# Patient Record
Sex: MALE | Race: WHITE | Employment: FULL TIME | URBAN - METROPOLITAN AREA
[De-identification: names, ages, dates, MRNs, and addresses within clinical notes are randomized per-mention and may not be internally consistent; named-entity substitution may affect disease eponyms.]

---

## 2021-11-26 ENCOUNTER — HOSPITAL ENCOUNTER (OUTPATIENT)
Age: 61
Setting detail: OUTPATIENT SURGERY
End: 2021-11-26
Attending: SPECIALIST | Admitting: SPECIALIST

## 2021-11-26 NOTE — PROGRESS NOTES
Discussed situation w/pt r/t no PAT appts w/H&P prior to DOS. Pt has appt w/PCP on 12/1. Instructed him to change appt type to pre-op H&P & to call back only if there was an issue w/that request. Pt verbalized understanding.

## 2021-12-02 NOTE — PERIOP NOTES
During reminder call for PAT appt 12/3 , patient mentioned he was having a stress test on 12/8 the day prior to surgery. Asked if that was an issue - patient was instructed during phone call to call Dr. Katelyn Kowalski to discuss.

## 2021-12-03 ENCOUNTER — HOSPITAL ENCOUNTER (OUTPATIENT)
Dept: PREADMISSION TESTING | Age: 61
Discharge: HOME | End: 2021-12-03
Payer: COMMERCIAL

## 2021-12-03 VITALS
DIASTOLIC BLOOD PRESSURE: 83 MMHG | BODY MASS INDEX: 39.96 KG/M2 | HEART RATE: 77 BPM | RESPIRATION RATE: 16 BRPM | SYSTOLIC BLOOD PRESSURE: 133 MMHG | HEIGHT: 72 IN | WEIGHT: 295 LBS | OXYGEN SATURATION: 96 % | TEMPERATURE: 98.4 F

## 2021-12-03 LAB
ABO + RH BLD: NORMAL
ANION GAP SERPL CALC-SCNC: 9 MMOL/L (ref 5–15)
ATRIAL RATE: 60 BPM
BASOPHILS # BLD: 0.1 K/UL (ref 0–0.1)
BASOPHILS NFR BLD: 1 % (ref 0–1)
BLOOD GROUP ANTIBODIES SERPL: NORMAL
BUN SERPL-MCNC: 23 MG/DL (ref 6–20)
BUN/CREAT SERPL: 23 (ref 12–20)
CALCIUM SERPL-MCNC: 9 MG/DL (ref 8.5–10.1)
CALCULATED P AXIS, ECG09: 51 DEGREES
CALCULATED R AXIS, ECG10: 44 DEGREES
CALCULATED T AXIS, ECG11: 27 DEGREES
CHLORIDE SERPL-SCNC: 105 MMOL/L (ref 97–108)
CO2 SERPL-SCNC: 25 MMOL/L (ref 21–32)
CREAT SERPL-MCNC: 1 MG/DL (ref 0.7–1.3)
DIAGNOSIS, 93000: NORMAL
DIFFERENTIAL METHOD BLD: NORMAL
EOSINOPHIL # BLD: 0.4 K/UL (ref 0–0.4)
EOSINOPHIL NFR BLD: 5 % (ref 0–7)
ERYTHROCYTE [DISTWIDTH] IN BLOOD BY AUTOMATED COUNT: 12 % (ref 11.5–14.5)
GLUCOSE SERPL-MCNC: 276 MG/DL (ref 65–100)
HCT VFR BLD AUTO: 41.5 % (ref 36.6–50.3)
HGB BLD-MCNC: 14.2 G/DL (ref 12.1–17)
IMM GRANULOCYTES # BLD AUTO: 0 K/UL (ref 0–0.04)
IMM GRANULOCYTES NFR BLD AUTO: 0 % (ref 0–0.5)
LYMPHOCYTES # BLD: 2.2 K/UL (ref 0.8–3.5)
LYMPHOCYTES NFR BLD: 32 % (ref 12–49)
MCH RBC QN AUTO: 31.3 PG (ref 26–34)
MCHC RBC AUTO-ENTMCNC: 34.2 G/DL (ref 30–36.5)
MCV RBC AUTO: 91.4 FL (ref 80–99)
MONOCYTES # BLD: 0.5 K/UL (ref 0–1)
MONOCYTES NFR BLD: 8 % (ref 5–13)
NEUTS SEG # BLD: 3.7 K/UL (ref 1.8–8)
NEUTS SEG NFR BLD: 54 % (ref 32–75)
NRBC # BLD: 0 K/UL (ref 0–0.01)
NRBC BLD-RTO: 0 PER 100 WBC
P-R INTERVAL, ECG05: 168 MS
PLATELET # BLD AUTO: 255 K/UL (ref 150–400)
PMV BLD AUTO: 9 FL (ref 8.9–12.9)
POTASSIUM SERPL-SCNC: 4.4 MMOL/L (ref 3.5–5.1)
Q-T INTERVAL, ECG07: 414 MS
QRS DURATION, ECG06: 146 MS
QTC CALCULATION (BEZET), ECG08: 414 MS
RBC # BLD AUTO: 4.54 M/UL (ref 4.1–5.7)
SODIUM SERPL-SCNC: 139 MMOL/L (ref 136–145)
SPECIMEN EXP DATE BLD: NORMAL
VENTRICULAR RATE, ECG03: 60 BPM
WBC # BLD AUTO: 6.8 K/UL (ref 4.1–11.1)

## 2021-12-03 PROCEDURE — 36415 COLL VENOUS BLD VENIPUNCTURE: CPT

## 2021-12-03 PROCEDURE — U0005 INFEC AGEN DETEC AMPLI PROBE: HCPCS

## 2021-12-03 PROCEDURE — 93005 ELECTROCARDIOGRAM TRACING: CPT

## 2021-12-03 PROCEDURE — 86901 BLOOD TYPING SEROLOGIC RH(D): CPT

## 2021-12-03 PROCEDURE — 85025 COMPLETE CBC W/AUTO DIFF WBC: CPT

## 2021-12-03 PROCEDURE — 80048 BASIC METABOLIC PNL TOTAL CA: CPT

## 2021-12-03 RX ORDER — GLIPIZIDE 10 MG/1
10 TABLET ORAL 2 TIMES DAILY
COMMUNITY

## 2021-12-03 RX ORDER — IBUPROFEN 200 MG
400 TABLET ORAL 2 TIMES DAILY
COMMUNITY

## 2021-12-03 RX ORDER — METFORMIN HYDROCHLORIDE 1000 MG/1
1000 TABLET ORAL 2 TIMES DAILY WITH MEALS
COMMUNITY

## 2021-12-03 RX ORDER — FENOFIBRATE 160 MG/1
160 TABLET ORAL DAILY
COMMUNITY

## 2021-12-03 RX ORDER — ATORVASTATIN CALCIUM 20 MG/1
TABLET, FILM COATED ORAL DAILY
COMMUNITY

## 2021-12-03 NOTE — PERIOP NOTES
[address] [address] [facility]   MAIN OR                                  [phone]   MAIN PRE OP                          [phone]                                                                                AMBULATORY PRE OP          [phone]  PRE-ADMISSION TESTING    [phone]   Surgery Date:  12/9/21 THURSDAY       Is surgery arrival time given by surgeon? NO  If NO, [facility] staff will call you between 3 and 7pm the day before your surgery with your arrival time. (If your surgery is on a Monday, we will call you the Friday before.)    Call [phone] after 7pm Monday-Friday if you did not receive this call. INSTRUCTIONS BEFORE YOUR SURGERY   When You  Arrive Arrive at the 2[address] [facility] on the day of your surgery  Have your insurance card, photo ID, and any copayment (if needed)   Food   and   Drink NO food or drink after midnight the night before surgery    This means NO water, gum, mints, coffee, juice, etc.  No alcohol (beer, wine, liquor) 24 hours before and after surgery   Medications to   TAKE   Morning of Surgery MEDICATIONS TO TAKE THE MORNING OF SURGERY WITH A SIP OF WATER:    NONE     Medications  To  STOP      7 days before surgery  Non-Steroidal anti-inflammatory Drugs (NSAID's): for example, Ibuprofen (Advil, Motrin), Naproxen (Aleve)   Aspirin, if taking for pain    Herbal supplements, vitamins, and fish oil   Other:  (Pain medications not listed above, including Tylenol may be taken)   Blood  Thinners  If you take  Aspirin, Plavix, Coumadin, or any blood-thinning or anti-blood clot medicine, talk to the doctor who prescribed the medications for pre-operative instructions.    Bathing Clothing  Jewelry  Valuables      If you shower the morning of surgery, please do not apply anything to your skin (lotions, powders, deodorant, or makeup, especially mascara)   Follow Chlorhex[employer] body wash instructions provided to you during PAT appointment. Begin 3 days prior to surgery.  Do not shave or trim anywhere 24 hours before surgery   Wear your hair loose or down; no pony-tails, buns, or metal hair clips   Wear loose, comfortable, clean clothes   Wear glasses instead of contacts  [employer] money, valuables, and jewelry, including body piercings, at home   If you were given an [employer], bring it on day of surgery. Going Home - or Spending the Night  SAME-DAY SURGERY: You must have a responsible adult drive you home and stay with you 24 hours after surgery   ADMITS: If your doctor is keeping you in the hospital after surgery, leave personal belongings/luggage in your car until you have a hospital room number. Hospital discharge time is 12 noon  Drivers must be here before 12 noon unless you are told differently   Special Instructions   Patients are advised to self-quarantine at home  prior to your surgery date. You will be notified if your results are positive. What to watch for:   Coronavirus (COVID-19) affects different people in different ways   It also appears with a wide range of symptoms from mild to severe   Signs usually appear 2-14 days after exposure     If you develop any of the following, notify your doctor immediately:  o Fever  o Chills, with or without a shiver  o Muscle pain  o Headache  o Sore throat  o Dry cough  o New loss of taste or smell  o Tiredness      If you develop any of the following, call 911:  o Shortness of breath  o Difficulty breathing  o Chest pain  o New confusion  Blueness of fingers and/or lips       Follow all instructions so your surgery wont be cancelled. Please, be on time. If a situation occurs and you are delayed the day of surgery, call [phone] or [phone]. If your physical condition changes (like a fever, cold, flu, etc.) call your surgeon.     Home medication(s) reviewed and verified via   LIST   VERBAL   during PAT appointment. The patient was contacted by   IN-PERSON  The patient verbalizes understanding of all instructions and   DOES NOT   need reinforcement.

## 2021-12-04 LAB
SARS-COV-2, XPLCVT: NOT DETECTED
SOURCE, COVRS: NORMAL

## 2021-12-08 RX ORDER — SODIUM CHLORIDE, SODIUM LACTATE, POTASSIUM CHLORIDE, CALCIUM CHLORIDE 600; 310; 30; 20 MG/100ML; MG/100ML; MG/100ML; MG/100ML
125 INJECTION, SOLUTION INTRAVENOUS CONTINUOUS
Status: CANCELLED | OUTPATIENT
Start: 2021-12-08

## 2021-12-08 RX ORDER — DIPHENHYDRAMINE HYDROCHLORIDE 50 MG/ML
12.5 INJECTION, SOLUTION INTRAMUSCULAR; INTRAVENOUS AS NEEDED
Status: CANCELLED | OUTPATIENT
Start: 2021-12-08 | End: 2021-12-08

## 2021-12-08 RX ORDER — HYDROMORPHONE HYDROCHLORIDE 1 MG/ML
.25-1 INJECTION, SOLUTION INTRAMUSCULAR; INTRAVENOUS; SUBCUTANEOUS
Status: CANCELLED | OUTPATIENT
Start: 2021-12-08

## 2021-12-08 RX ORDER — LIDOCAINE HYDROCHLORIDE 10 MG/ML
0.1 INJECTION, SOLUTION EPIDURAL; INFILTRATION; INTRACAUDAL; PERINEURAL AS NEEDED
Status: CANCELLED | OUTPATIENT
Start: 2021-12-08

## 2021-12-08 RX ORDER — FLUMAZENIL 0.1 MG/ML
0.2 INJECTION INTRAVENOUS
Status: CANCELLED | OUTPATIENT
Start: 2021-12-08

## 2021-12-08 RX ORDER — SODIUM CHLORIDE, SODIUM LACTATE, POTASSIUM CHLORIDE, CALCIUM CHLORIDE 600; 310; 30; 20 MG/100ML; MG/100ML; MG/100ML; MG/100ML
125 INJECTION, SOLUTION INTRAVENOUS CONTINUOUS
Status: CANCELLED | OUTPATIENT
Start: 2021-12-08 | End: 2021-12-09

## 2021-12-08 RX ORDER — NALOXONE HYDROCHLORIDE 0.4 MG/ML
0.2 INJECTION, SOLUTION INTRAMUSCULAR; INTRAVENOUS; SUBCUTANEOUS
Status: CANCELLED | OUTPATIENT
Start: 2021-12-08

## 2021-12-17 ENCOUNTER — HOSPITAL ENCOUNTER (OUTPATIENT)
Age: 61
Setting detail: OUTPATIENT SURGERY
Discharge: HOME | End: 2021-12-17
Attending: SPECIALIST | Admitting: SPECIALIST
Payer: COMMERCIAL

## 2021-12-17 VITALS
SYSTOLIC BLOOD PRESSURE: 147 MMHG | HEART RATE: 74 BPM | BODY MASS INDEX: 39.04 KG/M2 | RESPIRATION RATE: 21 BRPM | DIASTOLIC BLOOD PRESSURE: 75 MMHG | WEIGHT: 288.2 LBS | TEMPERATURE: 98.4 F | HEIGHT: 72 IN | OXYGEN SATURATION: 90 %

## 2021-12-17 DIAGNOSIS — R94.39 ABNORMAL STRESS TEST: ICD-10-CM

## 2021-12-17 LAB
GLUCOSE BLD STRIP.AUTO-MCNC: 128 MG/DL (ref 65–117)
GLUCOSE BLD STRIP.AUTO-MCNC: 150 MG/DL (ref 65–117)
SERVICE CMNT-IMP: ABNORMAL
SERVICE CMNT-IMP: ABNORMAL

## 2021-12-17 PROCEDURE — 77030003390 HC NDL ANGI MRTM -A: Performed by: SPECIALIST

## 2021-12-17 PROCEDURE — 99153 MOD SED SAME PHYS/QHP EA: CPT | Performed by: SPECIALIST

## 2021-12-17 PROCEDURE — 74011000636 HC RX REV CODE- 636: Performed by: SPECIALIST

## 2021-12-17 PROCEDURE — 77030029065 HC DRSG HEMO QCLOT ZMED -B: Performed by: SPECIALIST

## 2021-12-17 PROCEDURE — C1894 INTRO/SHEATH, NON-LASER: HCPCS | Performed by: SPECIALIST

## 2021-12-17 PROCEDURE — 2709999900 HC NON-CHARGEABLE SUPPLY: Performed by: SPECIALIST

## 2021-12-17 PROCEDURE — C1760 CLOSURE DEV, VASC: HCPCS | Performed by: SPECIALIST

## 2021-12-17 PROCEDURE — 93458 L HRT ARTERY/VENTRICLE ANGIO: CPT | Performed by: SPECIALIST

## 2021-12-17 PROCEDURE — 74011000250 HC RX REV CODE- 250: Performed by: SPECIALIST

## 2021-12-17 PROCEDURE — 77030004532 HC CATH ANGI DX IMP BSC -A: Performed by: SPECIALIST

## 2021-12-17 PROCEDURE — 99152 MOD SED SAME PHYS/QHP 5/>YRS: CPT | Performed by: SPECIALIST

## 2021-12-17 PROCEDURE — 82962 GLUCOSE BLOOD TEST: CPT

## 2021-12-17 PROCEDURE — 74011250636 HC RX REV CODE- 250/636: Performed by: SPECIALIST

## 2021-12-17 RX ORDER — MIDAZOLAM HYDROCHLORIDE 1 MG/ML
INJECTION, SOLUTION INTRAMUSCULAR; INTRAVENOUS AS NEEDED
Status: DISCONTINUED | OUTPATIENT
Start: 2021-12-17 | End: 2021-12-17 | Stop reason: HOSPADM

## 2021-12-17 RX ORDER — SODIUM CHLORIDE 0.9 % (FLUSH) 0.9 %
5-40 SYRINGE (ML) INJECTION AS NEEDED
Status: DISCONTINUED | OUTPATIENT
Start: 2021-12-17 | End: 2021-12-17 | Stop reason: HOSPADM

## 2021-12-17 RX ORDER — HYDROCORTISONE SODIUM SUCCINATE 100 MG/2ML
100 INJECTION, POWDER, FOR SOLUTION INTRAMUSCULAR; INTRAVENOUS
Status: DISCONTINUED | OUTPATIENT
Start: 2021-12-17 | End: 2021-12-17 | Stop reason: HOSPADM

## 2021-12-17 RX ORDER — HEPARIN SODIUM 200 [USP'U]/100ML
INJECTION, SOLUTION INTRAVENOUS
Status: COMPLETED | OUTPATIENT
Start: 2021-12-17 | End: 2021-12-17

## 2021-12-17 RX ORDER — DIPHENHYDRAMINE HYDROCHLORIDE 50 MG/ML
25 INJECTION, SOLUTION INTRAMUSCULAR; INTRAVENOUS
Status: DISCONTINUED | OUTPATIENT
Start: 2021-12-17 | End: 2021-12-17 | Stop reason: HOSPADM

## 2021-12-17 RX ORDER — SODIUM CHLORIDE 9 MG/ML
75 INJECTION, SOLUTION INTRAVENOUS CONTINUOUS
Status: DISCONTINUED | OUTPATIENT
Start: 2021-12-17 | End: 2021-12-17

## 2021-12-17 RX ORDER — LIDOCAINE HYDROCHLORIDE 10 MG/ML
INJECTION INFILTRATION; PERINEURAL AS NEEDED
Status: DISCONTINUED | OUTPATIENT
Start: 2021-12-17 | End: 2021-12-17 | Stop reason: HOSPADM

## 2021-12-17 RX ORDER — SODIUM CHLORIDE 0.9 % (FLUSH) 0.9 %
5-40 SYRINGE (ML) INJECTION EVERY 8 HOURS
Status: DISCONTINUED | OUTPATIENT
Start: 2021-12-17 | End: 2021-12-17 | Stop reason: HOSPADM

## 2021-12-17 RX ORDER — SODIUM CHLORIDE 9 MG/ML
125 INJECTION, SOLUTION INTRAVENOUS CONTINUOUS
Status: DISCONTINUED | OUTPATIENT
Start: 2021-12-17 | End: 2021-12-17 | Stop reason: HOSPADM

## 2021-12-17 RX ORDER — FENTANYL CITRATE 50 UG/ML
INJECTION, SOLUTION INTRAMUSCULAR; INTRAVENOUS AS NEEDED
Status: DISCONTINUED | OUTPATIENT
Start: 2021-12-17 | End: 2021-12-17 | Stop reason: HOSPADM

## 2021-12-17 NOTE — PROGRESS NOTES
3:35 PM  Patient ambulated to the bathroom, tolerated well.    3:40pm  IV and tele removed. 3:55pm  Patient escorted via wheelchair to entrance. Wife driving. Patient discharged into care of wife.

## 2021-12-17 NOTE — Clinical Note
Sheath #1: Closed using Vascade and Hemostasis Pad. Site secured by Tegaderm. Pressure held for: 10 minutes.

## 2021-12-17 NOTE — PROGRESS NOTES
1455: Bedside shift change report given to [facility] (oncoming nurse) by Roseanna Ha RN (offgoing nurse). Report included the following information SBAR and Procedure Summary. Patient with quikclot to right groin. Site clean, dry and intact. No hematoma. Pulses palpable. VS stable. Patient with no complaints at this time. HOB 30 degrees. 1500: HOB elevated 45 degrees.  Patient tolerated well

## 2021-12-17 NOTE — ROUTINE PROCESS
9:52 AM    Patient arrived. ID and allergies verified verbally with patient. Pt voices understanding of procedure to be performed. Consent obtained. Pt prepped for procedure. 1:45 PM    TRANSFER - IN REPORT:    Verbal report received from [facility] (name) on Mildred Heath  being received from Cath Lab (unit) for routine post - op      Report consisted of patients Situation, Background, Assessment and   Recommendations(SBAR). Information from the following report(s) Procedure Summary was reviewed with the receiving nurse. Opportunity for questions and clarification was provided. Assessment completed upon patients arrival to unit and care assumed. 2:53 PM    Discharge instructions reviewed with patient and family. Voiced understanding. Patient given copy of discharge instructions to take home.

## 2021-12-17 NOTE — DISCHARGE INSTRUCTIONS
Discharge Instructions:                    Cardiac Catheterization/Angiography Discharge Instructions    *Check the puncture site frequently for swelling or bleeding. If you see any bleeding, lie down and apply pressure over the area and call 911. Notify your doctor for any redness, swelling, drainage or oozing from the puncture site. Notify your doctor for any fever or chills. *If the leg or arm with the puncture becomes cold, numb or painful, call PCP or Cardiology    *Activity should be limited for the next 48 hours. Climb stairs as little as possible and avoid any stooping, bending or strenuous activity for 48 hours. No heavy lifting (anything over 10 pounds) for 5 days. *Do not drive for 24 hours. *You may resume your usual diet. Drink more fluids than usual.    *Have a responsible person drive you home and stay with you for at least 24 hours after your heart catheterization/angiography. *You may remove the bandage from your groin in 24 hours. You may shower in 24 hours. No tub baths, hot tubs or swimming for one week. Do not place any lotions, creams, powders, ointments over the puncture site for one week. You may place a clean band-aid over the puncture site each day for 5 days. Change this daily. Medications:     Do NOT restart metformin (glucophage) until 12/19/2021. Activity:     As tolerated except do not lift over 10 pounds for 5 days. He may return to work in his usual capacity on January 3rd, 2022. Regarding upcoming ENT surgery, Mr. Oliverio Lin is a Low Risk patient from a cardiac perspective who will be undergoing a Moderate Risk procedure. I recommend proceeding ahead with the procedure without further cardiac testing at this time. We would be happy to see Mr. Oliverio Lin during the jabari-procedural period if so desired. Diet:     American Heart Association. Follow-up:     Alfred Berg MD (PCP    If you smoke, STOP!

## 2021-12-17 NOTE — PROCEDURES
Cath:  Mild (non-obstructive) CAD     LAD LIs     LCx patent     RCA p30, d30; PDA ost40, m30  Normal LVF (EF 60%)  No AVG/MR  RFA vascade    F/U with PCP    Regarding upcoming ENT surgery, Mr. Riaz Fowler is a Low Risk patient from a cardiac perspective who will be undergoing a Moderate Risk procedure. I recommend proceeding ahead with the procedure without further cardiac testing at this time. We would be happy to see Mr. Riaz Fowler during the jabari-procedural period if so desired.

## 2021-12-17 NOTE — H&P
Date of Surgery Update:  Kalpana Boyer was seen and examined. History and physical has been reviewed. Signed By: Georgia Long MD     December 17, 2021 10:29 AM           Lonn Halsted 1960   Office/Outpatient Visit  Visit Date: Mon, Dec 13, 2021 3:15 pm  Provider: Aure Ames MD (Assistant: Michi Elliott RN )  Location: [facility])[address] [place]. [address] [phone]    Electronically signed by Georgia Long MD on  12/13/2021 03:37:05 PM                           Subjective:    CC: Mr. Mariza Navas is a 64year old male. His primary care physician is Hakeem Hoang MD.  Hakeem Hoang MD referred Mr. Mariza Navas to the practice for a consult. He presents for cardiac evaluation for pre-operative clearance. Mr. Mariza Navas is planning to have Warthins tumor removal surgery with Dr. Kezia Hoover on [facility]. He is here today following a transition of care from his specialist provider. Since his last visit, he has had the following testing: echocardiogram (12/8/2021) and treadmill stress test (12/8/2021). The primary reason for today's visit is evaluation of the following: abnormal EKG. He has a history of hypercholesterolemia. NO EKG today     HPI:       Encounter for preprocedural cardiovascular examination noted. MD Notes: Sinus, RBBB. He had a stress test years ago prior to a surgery and states it was normal.  His father passed suddenly at 54, thought to be cardiac related. Abnormal electrocardiogram [ECG] [EKG] noted. He denies chest pain, palpitations, dyspnea and ankle swelling. ROS:   Discussed relevant lab and imaging studies along with the plan of treatment with the nurse. EYES:  Negative for blurred vision and eye pain. E/N/T:  Negative for epistaxis and hoarseness. CARDIOVASCULAR:  Negative for chest pain, orthopnea, palpitations and pedal edema. RESPIRATORY:  Negative for cough and hemoptysis.     GASTROINTESTINAL:  Negative for abdominal pain and dysphagia. MUSCULOSKELETAL:  Negative for arthralgias and back pain. NEUROLOGICAL:  Negative for headaches, paresthesias, and weakness. HEMATOLOGIC/LYMPHATIC:  Negative for easy bruising, bleeding, and lymphadenopathy. PSYCHIATRIC:  No symptoms reported. Past Medical History / Family History / Social History:     Last Reviewed on 2021 03:18 PM by Murray Subramanian  Past Medical History:     Warthins tumor   Type 2 Diabetes CC:   Hyperlipidemia: Hypertriglyceridemia;   INFLUENZA VACCINE: declined   COVID-19 VACCINE: was last done  Moderna x2     Past Cardiac Procedures/Tests:  Echocardiogram on 21 -  There was ECG evidence of ischemia. The patient did not experience chest discomfort. Normal  blood pressure response to exercise. Normal heart rate response to exercise. Poor exercise capacity. .  Treadmill Stress Test:  21 - 5 min : There was ECG evidence of ischemia. The patient did not experience chest discomfort. Normal  blood pressure response to exercise. Normal heart rate response to exercise. Poor exercise capacity. TESTING ( Dr Jamshid Riley)     1. CARDIAC  a.  Echo (21):  EF 45-50% with sept HK, mod LVH. Mildly dil LA/RA. Mild TR.  b.  ETT (21): No CP, + ETT. 2.  DIABETES MELLITUS    3. DYSLIPIDEMIA      Surgical History:   Surgical/Procedural History:   Joint Replacement:  Hip Replacement: right;   back surgery  perianal cyst removal     Family History:   Father: ;  type 2 diabetes   Mother: Healthy     Social History:   Social History:   Occupation: a    Marital Status:    Children: 4 children     Tobacco/Alcohol/Supplements:   Last Reviewed on 2021 03:18 PM by Murray Subramanian  TOBACCO/ALCOHOL/SUPPLEMENTS   Tobacco: Current Smoker: He currently smokes every day, 1/2 pack per day. Smoking cessation intervention counseling was conducted. Alcohol: Drinks alcohol infrequently.     Caffeine:  He admits to consuming caffeine via coffee ( 3 servings per day ). Substance Abuse History:   Last Reviewed on 12/13/2021 03:18 PM by Brunilda Douglas    Mental Health History:   Last Reviewed on 12/13/2021 03:18 PM by Brunilda Douglas    Communicable Diseases (eg STDs): Last Reviewed on 12/13/2021 03:18 PM by Brunilda Douglas    Current Problems:   Last Reviewed on 12/13/2021 03:18 PM by Brunilda Douglas  Encounter for preprocedural cardiovascular examination  Cardiac murmur, unspecified  Abnormal electrocardiogram [ECG] [EKG]  Pure hypercholesterolemia  Pure hypercholesterolemia, unspecified  Pure hyperglyceridemia  Hypercholesterolemia  Abnormal EKG  Preoperative cardiovascular examination    Allergies:   Last Reviewed on 12/13/2021 03:18 PM by Brunilda Douglas  No Known Allergies. Current Medications:   Last Reviewed on 12/13/2021 03:18 PM by Brunilda Douglas  metFORMIN 1,000 mg oral tablet [take 1 tablet (1,000 mg) by oral route 2 times per day with morning and evening meals]  AdviL  [prn]  glipiZIDE  [DOSE  UNKNOWN]    Objective:    Vitals:     Historical:   12/2/2021  BP:   140/88 mm Hg ( (left arm, , standing, );) 12/2/2021  BP:   132/86 mm Hg ( (left arm, , sitting, );) 12/2/2021  Wt:   296lbs  Current: 12/13/2021 3:16:05 PM  Ht:  6 ft, 0 in; Wt: 290 lbs;  BMI: 39. 3BP: 139/81 mm Hg (left arm, sitting);  P: 82 bpm (left arm (BP Cuff), sitting)    Repeat:   3:17:30 PM  BP:   143/78mm Hg (left arm, standing)   Exams:   GENERAL:  Alert, oriented to person, place and time x3. EYES:  Normal lids without xanthelasma; conjunctiva unremarkable; no scleral icterus. HEENT:  Face symmetric, voice clear, extraocular muscles intact. NECK:  Supple. No bruits, No JVD. LUNGS:  Clear to auscultation. No rales, no wheezing. CARDIAC: Regular rate and rhythm. S1 and S2 normal. No murmur , gallops or rubs. ABDOMEN:  Soft. Positive bowel sounds. Non-tender. MUSCULOSKELETAL:  Normal range of motion, strength and tone. EXTREMITIES:  No edema. Good muscle tone and strength. SKIN: No significant rashes or lesions; no suspicious moles. NEUROLOGICAL:  No focal deficits, cranial nerves II-XII are grossly intact. PSYCHIATRIC:  Appropriate affect and demeanor; normal speech pattern; grossly normal memory. Assessment:   Abnormal echo with SWMA and +ETT by EKG  Further eval with cath. F/U with me based on testing. Discussed diet, exercise, and smoking cessation. Z01.810   Encounter for preprocedural cardiovascular examination     R94.31   Abnormal electrocardiogram [ECG] [EKG]     E78.0   Pure hypercholesterolemia       ORDERS:     Procedures Ordered:     XCATH  Cardiac Cath  (In-House)          Other Orders:     [id number]  Queried Patient for Tobacco Use  (Send-Out)              Plan:     Encounter for preprocedural cardiovascular examination      Medication list has been reviewed. Continue current medications. Smoking status: Mr. Jade Chakraborty currently smokes cigarettes. Smoking cessation discussed with patient. The counseling session lasted less than 10 minutes. Testing/Procedures:   Cardiac Catheterization   Instructed to call immediately if he develops new or worsening symptoms, such as shortness of breath and chest pain. Schedule follow-up appointments on a p.r.n. basis. Schedule a follow-up appointment after testing is complete       Orders:     [id number]  Queried Patient for Tobacco Use  (Send-Out)          XCATH  Cardiac Cath  (In-House)            Patient Education Handouts:     COV Heart Healthy Diet        Patient Recommendations: For  Encounter for preprocedural cardiovascular examination:  Your medication list has been reviewed. Continue current medications. You need to have the following test/procedure(s) done:     Cardiac Catheterization - Explained to you the indication, procedure, risks and benefits of cardiac catheterization.   You expressed a desire to go forward with the procedure to be performed     Please call immediately if you experience new or worsening symptoms such as shortness of breath; chest pain;    Schedule follow-up appointments as needed.

## 2021-12-17 NOTE — Clinical Note
TRANSFER - IN REPORT:     Verbal report received from: Denise RICHMOND. Report consisted of patient's Situation, Background, Assessment and   Recommendations(SBAR). Opportunity for questions and clarification was provided. Assessment completed upon patient's arrival to unit and care assumed. Patient transported with a Registered Nurse.

## 2023-05-15 RX ORDER — GLIPIZIDE 10 MG/1
10 TABLET ORAL 2 TIMES DAILY
COMMUNITY

## 2023-05-15 RX ORDER — ATORVASTATIN CALCIUM 20 MG/1
TABLET, FILM COATED ORAL DAILY
COMMUNITY

## 2023-05-15 RX ORDER — FENOFIBRATE 160 MG/1
160 TABLET ORAL DAILY
COMMUNITY

## 2023-05-15 RX ORDER — IBUPROFEN 200 MG
400 TABLET ORAL 2 TIMES DAILY
COMMUNITY

## (undated) DEVICE — PINNACLE INTRODUCER SHEATH: Brand: PINNACLE

## (undated) DEVICE — DRESSING HEMOSTATIC SFT INTVENT W/O SLT DBL WRP QUIKCLOT LF

## (undated) DEVICE — CATH DIAG-D 6F MULTI PIG 155 5 -- IMPULSE 16599-302

## (undated) DEVICE — NEEDLE ANGIO 18GA L9CM NRML 1 WALL SMOOTH FINISH CLR HUB FOR

## (undated) DEVICE — HEART CATH-MRMC: Brand: MEDLINE INDUSTRIES, INC.

## (undated) DEVICE — PROCEDURE KIT FLUID MGMT CUST MAINFOLD STRL

## (undated) DEVICE — Device